# Patient Record
Sex: MALE | URBAN - METROPOLITAN AREA
[De-identification: names, ages, dates, MRNs, and addresses within clinical notes are randomized per-mention and may not be internally consistent; named-entity substitution may affect disease eponyms.]

---

## 2023-01-19 ENCOUNTER — OFFICE VISIT (OUTPATIENT)
Dept: FAMILY MEDICINE CLINIC | Age: 23
End: 2023-01-19
Payer: MEDICAID

## 2023-01-19 VITALS
HEART RATE: 74 BPM | HEIGHT: 70 IN | BODY MASS INDEX: 24.71 KG/M2 | DIASTOLIC BLOOD PRESSURE: 78 MMHG | OXYGEN SATURATION: 99 % | SYSTOLIC BLOOD PRESSURE: 118 MMHG | TEMPERATURE: 97.3 F | WEIGHT: 172.6 LBS

## 2023-01-19 DIAGNOSIS — Z13.220 SCREENING CHOLESTEROL LEVEL: ICD-10-CM

## 2023-01-19 DIAGNOSIS — Z11.3 SCREENING EXAMINATION FOR STD (SEXUALLY TRANSMITTED DISEASE): ICD-10-CM

## 2023-01-19 DIAGNOSIS — Z13.228 ENCOUNTER FOR SCREENING FOR OTHER METABOLIC DISORDERS: ICD-10-CM

## 2023-01-19 DIAGNOSIS — Z13.0 SCREENING FOR DEFICIENCY ANEMIA: ICD-10-CM

## 2023-01-19 DIAGNOSIS — Z86.19 HISTORY OF GENITAL WARTS: ICD-10-CM

## 2023-01-19 DIAGNOSIS — Z13.1 DIABETES MELLITUS SCREENING: ICD-10-CM

## 2023-01-19 DIAGNOSIS — Z00.00 WELL ADULT EXAM: Primary | ICD-10-CM

## 2023-01-19 NOTE — PROGRESS NOTES
Subjective  Chief Complaint   Patient presents with    Establish Care     Discuss HTN      HPI:  Keith Delgadillo is a 25 y.o. male. 45-year-old male presents as new patient  Central be doing his annual wellness with physical and fasting labs. He gives a history of having high blood pressure that was treated while he was in long term. Since he has gotten he has not had any issues. He would like to think about getting a Tdap and HPV. He had gone to [facility] for sexually transmitted disease testing. He has a history of genital warts and would like an oral swab for other sexually transmitted diseases. Otherwise he is without complaint. Past Medical History:   Diagnosis Date    Asthma     Hypertension      Family History   Problem Relation Age of Onset    No Known Problems Mother     No Known Problems Father      Social History     Socioeconomic History    Marital status: UNKNOWN     Spouse name: Not on file    Number of children: Not on file    Years of education: Not on file    Highest education level: Not on file   Occupational History    Not on file   Tobacco Use    Smoking status: Some Days     Types: Cigarettes    Smokeless tobacco: Never   Vaping Use    Vaping Use: Never used   Substance and Sexual Activity    Alcohol use: Never    Drug use: Not on file    Sexual activity: Not on file   Other Topics Concern    Not on file   Social History Narrative    Not on file     Social Determinants of Health     Financial Resource Strain: Low Risk     Difficulty of Paying Living Expenses: Not hard at all   Food Insecurity: No Food Insecurity    Worried About Running Out of Food in the Last Year: Never true    Ran Out of Food in the Last Year: Never true   Transportation Needs: Not on file   Physical Activity: Not on file   Stress: Not on file   Social Connections: Not on file   Intimate Partner Violence: Not on file   Housing Stability: Not on file     No current outpatient medications on file prior to visit.      No current facility-administered medications on file prior to visit. No Known Allergies  ROS  ROS per HPI and past medical history      Objective  Physical Exam  Vitals and nursing note reviewed. HENT:      Head: Normocephalic. Cardiovascular:      Rate and Rhythm: Normal rate and regular rhythm. Pulmonary:      Effort: Pulmonary effort is normal.      Breath sounds: Normal breath sounds. Abdominal:      General: Bowel sounds are normal.      Palpations: Abdomen is soft. Skin:     General: Skin is warm and dry. Neurological:      Mental Status: He is alert and oriented to person, place, and time. Psychiatric:         Mood and Affect: Mood normal.         Behavior: Behavior normal.         Thought Content: Thought content normal.         Judgment: Judgment normal.        Assessment & Plan      ICD-10-CM ICD-9-CM    1. Well adult exam  Z00.00 V70.0       2. Diabetes mellitus screening  Z13.1 V77.1 HEMOGLOBIN A1C WITH EAG      3. Screening cholesterol level  Z13.220 V77.91 LIPID PANEL      4. Encounter for screening for other metabolic disorders  H13.438 U00.76 METABOLIC PANEL, COMPREHENSIVE      5. Screening examination for STD (sexually transmitted disease)  Z11.3 V74.5 UPPER RESPIRATORY CULTURE      6. Screening for deficiency anemia  Z13.0 V78.1 CBC WITH AUTOMATED DIFF      7. History of genital warts  Z86.19 V12.09         Diagnoses and all orders for this visit:    1. Well adult exam  We are making sure that his health screenings are done in a timely fashion. They are as documented in the EMR. Patient declining the flu vaccine and is precontemplative about the HPV vaccine. He is also precontemplative about updating his Tdap today. He will be doing some research and will let me    2. Diabetes mellitus screening  -     HEMOGLOBIN A1C WITH EAG  Obtaining baseline A1c and will make treatment decisions when I get the results.     3. Screening cholesterol level  -     LIPID PANEL  Obtaining baseline lipid and will make treatment decisions when I get the results. 4. Encounter for screening for other metabolic disorders  -     METABOLIC PANEL, COMPREHENSIVE  Obtaining baseline CMP and will make treatment decisions when I get the results. 5. Screening examination for STD (sexually transmitted disease)  -     UPPER RESPIRATORY CULTURE  Throat culture for screening for sexually transmitted disease and will make treatment decisions when I get the results. 6. Screening for deficiency anemia  -     CBC WITH AUTOMATED DIFF  Baseline CBC and will make treatment decisions when I get the results. 7. History of genital warts  Patient is asymptomatic at this time. We will me know with exacerbation. Follow-up and Dispositions    Return in about 6 months (around 7/19/2023) for F/U OF CHRONIC CONDITIONS/FASTING LABS AND cpe.        Juancarlos Reich, NP

## 2023-01-19 NOTE — PROGRESS NOTES
Chief Complaint   Patient presents with    Establish Care     Discuss HTN    Visit Vitals  /78 (BP 1 Location: Left arm, BP Patient Position: Sitting, BP Cuff Size: Adult)   Pulse 74   Temp 97.3 °F (36.3 °C) (Temporal)   Ht 5' 10\" (1.778 m)   Wt 172 lb 9.6 oz (78.3 kg)   SpO2 99%   BMI 24.77 kg/m²       1. \"Have you been to the ER, urgent care clinic since your last visit? Hospitalized since your last visit? \" No    2. \"Have you seen or consulted any other health care providers outside of the [address] [place] [address] since your last visit? \" Yes When: 11/2022 Where: [facility] Reason for visit: STD testing       3. For patients aged 39-70: Has the patient had a colonoscopy / FIT/ Cologuard? NA - based on age      If the patient is female:    4. For patients aged 41-77: Has the patient had a mammogram within the past 2 years? NA - based on age or sex      11. For patients aged 21-65: Has the patient had a pap smear?  NA - based on age or sex  3 most recent PHQ Screens 1/19/2023   Little interest or pleasure in doing things Not at all   Feeling down, depressed, irritable, or hopeless Not at all   Total Score PHQ 2 0

## 2023-01-22 LAB
ALBUMIN SERPL-MCNC: 4.9 G/DL (ref 4.1–5.2)
ALBUMIN/GLOB SERPL: 1.7 {RATIO} (ref 1.2–2.2)
ALP SERPL-CCNC: 51 IU/L (ref 44–121)
ALT SERPL-CCNC: 22 IU/L (ref 0–44)
AST SERPL-CCNC: 20 IU/L (ref 0–40)
BACTERIA SPEC RESP CULT: ABNORMAL
BACTERIA SPEC RESP CULT: ABNORMAL
BASOPHILS # BLD AUTO: 0 X10E3/UL (ref 0–0.2)
BASOPHILS NFR BLD AUTO: 1 %
BILIRUB SERPL-MCNC: 0.5 MG/DL (ref 0–1.2)
BUN SERPL-MCNC: 13 MG/DL (ref 6–20)
BUN/CREAT SERPL: 11 (ref 9–20)
CALCIUM SERPL-MCNC: 9.5 MG/DL (ref 8.7–10.2)
CHLORIDE SERPL-SCNC: 103 MMOL/L (ref 96–106)
CHOLEST SERPL-MCNC: 169 MG/DL (ref 100–199)
CO2 SERPL-SCNC: 26 MMOL/L (ref 20–29)
CREAT SERPL-MCNC: 1.19 MG/DL (ref 0.76–1.27)
EGFR: 89 ML/MIN/1.73
EOSINOPHIL # BLD AUTO: 0.1 X10E3/UL (ref 0–0.4)
EOSINOPHIL NFR BLD AUTO: 2 %
ERYTHROCYTE [DISTWIDTH] IN BLOOD BY AUTOMATED COUNT: 12.9 % (ref 11.6–15.4)
EST. AVERAGE GLUCOSE BLD GHB EST-MCNC: 103 MG/DL
GLOBULIN SER CALC-MCNC: 2.9 G/DL (ref 1.5–4.5)
GLUCOSE SERPL-MCNC: 91 MG/DL (ref 70–99)
HBA1C MFR BLD: 5.2 % (ref 4.8–5.6)
HCT VFR BLD AUTO: 43.3 % (ref 37.5–51)
HDLC SERPL-MCNC: 44 MG/DL
HGB BLD-MCNC: 14.7 G/DL (ref 13–17.7)
IMM GRANULOCYTES # BLD AUTO: 0 X10E3/UL (ref 0–0.1)
IMM GRANULOCYTES NFR BLD AUTO: 0 %
LDLC SERPL CALC-MCNC: 110 MG/DL (ref 0–99)
LYMPHOCYTES # BLD AUTO: 1.8 X10E3/UL (ref 0.7–3.1)
LYMPHOCYTES NFR BLD AUTO: 46 %
MCH RBC QN AUTO: 29.8 PG (ref 26.6–33)
MCHC RBC AUTO-ENTMCNC: 33.9 G/DL (ref 31.5–35.7)
MCV RBC AUTO: 88 FL (ref 79–97)
MONOCYTES # BLD AUTO: 0.4 X10E3/UL (ref 0.1–0.9)
MONOCYTES NFR BLD AUTO: 9 %
NEUTROPHILS # BLD AUTO: 1.7 X10E3/UL (ref 1.4–7)
NEUTROPHILS NFR BLD AUTO: 42 %
PLATELET # BLD AUTO: 216 X10E3/UL (ref 150–450)
POTASSIUM SERPL-SCNC: 4.5 MMOL/L (ref 3.5–5.2)
PROT SERPL-MCNC: 7.8 G/DL (ref 6–8.5)
RBC # BLD AUTO: 4.93 X10E6/UL (ref 4.14–5.8)
SODIUM SERPL-SCNC: 144 MMOL/L (ref 134–144)
TRIGL SERPL-MCNC: 81 MG/DL (ref 0–149)
VLDLC SERPL CALC-MCNC: 15 MG/DL (ref 5–40)
WBC # BLD AUTO: 3.9 X10E3/UL (ref 3.4–10.8)

## 2023-01-24 RX ORDER — PENICILLIN V POTASSIUM 250 MG/5ML
250 POWDER, FOR SOLUTION ORAL 4 TIMES DAILY
Qty: 150 ML | Refills: 0 | Status: SHIPPED | OUTPATIENT
Start: 2023-01-24 | End: 2023-01-31

## 2023-01-27 ENCOUNTER — TELEPHONE (OUTPATIENT)
Dept: FAMILY MEDICINE CLINIC | Age: 23
End: 2023-01-27

## 2023-01-27 NOTE — TELEPHONE ENCOUNTER
Spoke with pt. Pt stated that he would like \"everything\" to be checked. I asked pt what specifically he wanted. I asked if he wanted STD or immune disease labs pt just stated everything. Are you able to put labs in for pt to get check?

## 2023-01-27 NOTE — TELEPHONE ENCOUNTER
Ft message for patient to call back to review echo. Maybe in regards to Labs?  I called him yesterday to go over had to leave message

## 2023-01-27 NOTE — TELEPHONE ENCOUNTER
Reason for call: Pt calling--he is asking if he can speak with Pavan Maciel personally. I informed she does not have a personal phone number, and that we can only provide the office number. I said I can send a message along to the nurse, and asked what he would like the message to entail. He did not provide details on what he wanted to say, and said he just only wants to talk to Pavan Maciel and to make sure to send the message back as an emergency.     Is this a new problem: yes     Date of last appointment:  1/19/2023     Can we respond via e[employer]: no    Best call back number: [phone]

## 2023-01-27 NOTE — TELEPHONE ENCOUNTER
----- Message from Saurabh Huerta NP sent at 1/24/2023  8:08 AM EST -----  You do appear to have a throat infection that I will be treating. Your other labs are basically normal.  Some values may be minimally outside the  \"normal\" range but are not harmful or clinically significant. Please contact the office if you have questions or concerns. We will recheck all your labs at your next office visit.

## 2023-02-18 PROBLEM — Z00.00 WELL ADULT EXAM: Status: RESOLVED | Noted: 2023-01-19 | Resolved: 2023-02-18

## 2023-09-12 ENCOUNTER — TELEPHONE (OUTPATIENT)
Facility: CLINIC | Age: 23
End: 2023-09-12

## 2023-09-12 NOTE — TELEPHONE ENCOUNTER
Called and spoke to contact number and stated that the next available in person appointment would be in March of next year. Stated that we would be receiving a call back whether they want to cancel or not     ----- Message from Kendall Medina sent at 9/12/2023 11:14 AM EDT -----  Subject: Appointment Request    Reason for Call: Established Patient Appointment needed: Routine Existing   Condition Follow Up    QUESTIONS    Reason for appointment request? No appointments available during search     Additional Information for Provider? Patient mom called back we called to   remind of appt fri but Qi Tristin wants to come in for a visit not virtual call   them back .  Latasha Murguia is his mom  ---------------------------------------------------------------------------  --------------  Paul Standing INFO  [phone]; OK to leave message on voicemail  ---------------------------------------------------------------------------  --------------  SCRIPT ANSWERS

## 2023-09-15 ENCOUNTER — TELEMEDICINE (OUTPATIENT)
Facility: CLINIC | Age: 23
End: 2023-09-15

## 2023-09-15 DIAGNOSIS — Z31.69 INFERTILITY COUNSELING: Primary | ICD-10-CM

## 2023-09-15 DIAGNOSIS — Z86.19 HISTORY OF GENITAL WARTS: ICD-10-CM

## 2023-09-15 SDOH — ECONOMIC STABILITY: FOOD INSECURITY: WITHIN THE PAST 12 MONTHS, THE FOOD YOU BOUGHT JUST DIDN'T LAST AND YOU DIDN'T HAVE MONEY TO GET MORE.: NEVER TRUE

## 2023-09-15 SDOH — ECONOMIC STABILITY: FOOD INSECURITY: WITHIN THE PAST 12 MONTHS, YOU WORRIED THAT YOUR FOOD WOULD RUN OUT BEFORE YOU GOT MONEY TO BUY MORE.: NEVER TRUE

## 2023-09-15 SDOH — ECONOMIC STABILITY: HOUSING INSECURITY
IN THE LAST 12 MONTHS, WAS THERE A TIME WHEN YOU DID NOT HAVE A STEADY PLACE TO SLEEP OR SLEPT IN A SHELTER (INCLUDING NOW)?: NO

## 2023-09-15 SDOH — ECONOMIC STABILITY: INCOME INSECURITY: HOW HARD IS IT FOR YOU TO PAY FOR THE VERY BASICS LIKE FOOD, HOUSING, MEDICAL CARE, AND HEATING?: NOT HARD AT ALL

## 2023-09-15 NOTE — PROGRESS NOTES
Ang Casas, was evaluated through a synchronous (real-time) audio-video encounter. The patient (or guardian if applicable) is aware that this is a billable service, which includes applicable co-pays. This Virtual Visit was conducted with patient's (and/or legal guardian's) consent. Patient identification was verified, and a caregiver was present when appropriate. The patient was located at Home: [address]  [address] [facility] [address]  Provider was located at [address] [facility] ([address] [facility]): [address] (:  2000) is a Established patient, presenting virtually for evaluation of the following:    Assessment & Plan   Below is the assessment and plan developed based on review of pertinent history, physical exam, labs, studies, and medications. 1. Infertility counseling  -     Julius Edmonds MD, Urology, [facility] )  Referral to urology for further evaluation and treatment of his inability to conceive for the last 4 years. 2. History of genital warts  Prescribed Condylox and patient will let me know if this does not ameliorate his problem. Subjective   81-year-old male [facility] told that he had a high BMI. Patient did not understand and I explained to him that it is just meant that he was slightly overweight. He asked how to bring it down in the answers to lose weight. Patient's BMI does not appear to be high and it is 24.77 patient also has a history of genital warts and [facility] that he said excoriated his scrotum but did not help the lesions on his shaft. He also has a current complaint of what he believes is infertility. He has been sexually active with his girlfriend for the last 4 years and they have been trying to have a child and he would like further evaluation and treatment.       Review of Systems  ROS per HPI and PMH       Objective   Patient-Reported Vitals  BP

## 2024-02-16 ENCOUNTER — APPOINTMENT (OUTPATIENT)
Facility: HOSPITAL | Age: 24
End: 2024-02-16
Payer: MEDICAID

## 2024-02-16 ENCOUNTER — HOSPITAL ENCOUNTER (EMERGENCY)
Facility: HOSPITAL | Age: 24
Discharge: HOME | End: 2024-02-16
Attending: EMERGENCY MEDICINE
Payer: MEDICAID

## 2024-02-16 VITALS
DIASTOLIC BLOOD PRESSURE: 83 MMHG | SYSTOLIC BLOOD PRESSURE: 129 MMHG | TEMPERATURE: 97.6 F | WEIGHT: 175 LBS | HEART RATE: 72 BPM | OXYGEN SATURATION: 99 % | HEIGHT: 70 IN | BODY MASS INDEX: 25.05 KG/M2 | RESPIRATION RATE: 16 BRPM
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
## 2024-02-16 DIAGNOSIS — S00.81XA ABRASION OF FOREHEAD, INITIAL ENCOUNTER: ICD-10-CM

## 2024-02-16 DIAGNOSIS — S09.8XXA BLUNT HEAD TRAUMA, INITIAL ENCOUNTER: Primary | ICD-10-CM

## 2024-02-16 PROCEDURE — 72125 CT NECK SPINE W/O DYE: CPT

## 2024-02-16 PROCEDURE — 70450 CT HEAD/BRAIN W/O DYE: CPT

## 2024-02-16 PROCEDURE — 99284 EMERGENCY DEPT VISIT MOD MDM: CPT
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
